# Patient Record
Sex: MALE
[De-identification: names, ages, dates, MRNs, and addresses within clinical notes are randomized per-mention and may not be internally consistent; named-entity substitution may affect disease eponyms.]

---

## 2020-06-09 ENCOUNTER — HOSPITAL ENCOUNTER (EMERGENCY)
Dept: HOSPITAL 92 - ERS | Age: 30
Discharge: HOME | End: 2020-06-09
Payer: SELF-PAY
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
## 2020-06-09 DIAGNOSIS — S52.502A: Primary | ICD-10-CM

## 2020-06-09 DIAGNOSIS — S52.612A: ICD-10-CM

## 2020-06-09 PROCEDURE — 29125 APPL SHORT ARM SPLINT STATIC: CPT
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
